# Patient Record
Sex: MALE | URBAN - METROPOLITAN AREA
[De-identification: names, ages, dates, MRNs, and addresses within clinical notes are randomized per-mention and may not be internally consistent; named-entity substitution may affect disease eponyms.]

---

## 2022-03-24 ENCOUNTER — OFFICE (OUTPATIENT)
Dept: URBAN - METROPOLITAN AREA CLINIC 67 | Facility: CLINIC | Age: 22
End: 2022-03-24

## 2022-03-24 VITALS
HEIGHT: 74 IN | HEART RATE: 88 BPM | WEIGHT: 308 LBS | SYSTOLIC BLOOD PRESSURE: 150 MMHG | DIASTOLIC BLOOD PRESSURE: 92 MMHG

## 2022-03-24 DIAGNOSIS — R14.0 ABDOMINAL DISTENSION (GASEOUS): ICD-10-CM

## 2022-03-24 DIAGNOSIS — R10.84 GENERALIZED ABDOMINAL PAIN: ICD-10-CM

## 2022-03-24 DIAGNOSIS — R19.8 OTHER SPECIFIED SYMPTOMS AND SIGNS INVOLVING THE DIGESTIVE S: ICD-10-CM

## 2022-03-24 PROCEDURE — 99204 OFFICE O/P NEW MOD 45 MIN: CPT | Performed by: INTERNAL MEDICINE

## 2022-03-24 NOTE — SERVICENOTES
I will start his evaluation with the above testing and we discussed that he can continue to take the Dicyclomine for now if he really feels that it helps with his symptoms. 
Further recommendations based on the results of the above testing.

## 2022-03-24 NOTE — SERVICEHPINOTES
Mitch Josue   is seen for an initial visit today - per his report, he has had "stomach trouble" that has been ongoing for several years. His symptoms are primarily manifested by gas/abdominal bloating, irregular/fluctuating bowel habits and occasional abdominal cramping.
br His symptoms are usually postprandial but without any obvious association to certain food types - he denies any GI tract bleeding symptoms or unexplained weight loss and there is no known family history of CRC, polyps or IBD. 
br
br A tTG Ab and Alpha Gal Ab done through Jeff Li's office in 10/2021 were both normal/negative - he was placed on a trial of Dicyclomine but isn't sure if it has helped with his symptoms.

## 2022-10-11 ENCOUNTER — OFFICE (OUTPATIENT)
Dept: URBAN - METROPOLITAN AREA CLINIC 67 | Facility: CLINIC | Age: 22
End: 2022-10-11

## 2022-10-11 VITALS
WEIGHT: 295 LBS | HEIGHT: 74 IN | DIASTOLIC BLOOD PRESSURE: 80 MMHG | HEART RATE: 98 BPM | SYSTOLIC BLOOD PRESSURE: 140 MMHG | OXYGEN SATURATION: 99 %

## 2022-10-11 DIAGNOSIS — R10.84 GENERALIZED ABDOMINAL PAIN: ICD-10-CM

## 2022-10-11 DIAGNOSIS — R19.8 OTHER SPECIFIED SYMPTOMS AND SIGNS INVOLVING THE DIGESTIVE S: ICD-10-CM

## 2022-10-11 DIAGNOSIS — R14.0 ABDOMINAL DISTENSION (GASEOUS): ICD-10-CM

## 2022-10-11 PROCEDURE — 99214 OFFICE O/P EST MOD 30 MIN: CPT | Performed by: INTERNAL MEDICINE

## 2022-10-11 NOTE — SERVICEHPINOTES
The patient is seen today for follow-up regarding irregular bowel habits and abdominal bloating. A tTG Ab and Alpha Gal Ab done through Jeff Li's office in 10/2021 were both normal/negative.brIn the interim since his last appointment, blood work done on 3/24/22 demonstrated a normal CBC and CRP - a food allergy profile revealed an equivocal/low reaction to egg whites and cow's milk.A fecal elastase was normal on 4/5/22 but his calprotectin was slightly elevated at 195.brA sucrase BT was normal but a SIBO BT was positive so he was placed on a course of Xifaxan. Today, he reports that his symptoms resolved with the course of Xifaxan but in the past several weeks, his symptoms (bloating, bowel irregularity, abdominal cramping) have returned. He denies any GI tract bleeding symptoms, emesis or unexplained weight loss - he has been taking the Dicyclomine as needed (usually about twice per day) but with equivocal results.

## 2022-10-11 NOTE — SERVICENOTES
I will repeat a SIBO BT today and plan for another course of Rifaximin if again positive - I will also repeat a fecal calprotectin.